# Patient Record
(demographics unavailable — no encounter records)

---

## 2025-04-14 NOTE — IMAGING
[Left] : left knee [AP] : anteroposterior [Lateral] : lateral [Moderate tricompartmental OA lateral narrowing] : Moderate tricompartmental OA lateral narrowing

## 2025-04-14 NOTE — PHYSICAL EXAM
[Left] : left knee [NL (0)] : extension 0 degrees [5___] : hamstring 5[unfilled]/5 [] : negative Lachmann [Equivocal] : equivocal Edelmira [TWNoteComboBox7] : flexion 120 degrees

## 2025-04-14 NOTE — HISTORY OF PRESENT ILLNESS
[5] : 5 [Dull/Aching] : dull/aching [Meds] : meds [Ice] : ice [de-identified] : 04/14/2025: SEB HOLLAND is a 73 year-old female here for L knee pain. She states that she noticed swelling and stiffness in her left knee four days ago. Pain located laterally. No injury/trauma. Had 1 prior CSI left knee but had bad reaction to the cortisone [] : no [FreeTextEntry1] : L knee [de-identified] : prolonged standing

## 2025-04-22 NOTE — PROCEDURE
[Large Joint Injection] : Large joint injection [Left] : of the left [Knee] : knee [Pain] : pain [Alcohol] : alcohol [Betadine] : betadine [Ethyl Chloride sprayed topically] : ethyl chloride sprayed topically [Sterile technique used] : sterile technique used [Orthovisc (30mg)] : 30mg of Orthovisc [#1] : series #1 [] : Patient tolerated procedure well [Call if redness, pain or fever occur] : call if redness, pain or fever occur [Apply ice for 15min out of every hour for the next 12-24 hours as tolerated] : apply ice for 15 minutes out of every hour for the next 12-24 hours as tolerated [Patient was advised to rest the joint(s) for ____ days] : patient was advised to rest the joint(s) for [unfilled] days [Previous OTC use and PT nontherapeutic] : patient has tried OTC's including aspirin, Ibuprofen, Aleve, etc or prescription NSAIDS, and/or exercises at home and/or physical therapy without satisfactory response [Patient had decreased mobility in the joint] : patient had decreased mobility in the joint [Risks, benefits, alternatives discussed / Verbal consent obtained] : the risks benefits, and alternatives have been discussed, and verbal consent was obtained

## 2025-04-22 NOTE — HISTORY OF PRESENT ILLNESS
[de-identified] : 04/22/2025 Orthovisc 1 04/14/2025: SEB HOLLAND is a 73 year-old female here for L knee pain. She states that she noticed swelling and stiffness in her left knee four days ago. Pain located laterally. No injury/trauma. Had 1 prior CSI left knee but had bad reaction to the cortisone [5] : 5 [Dull/Aching] : dull/aching [Meds] : meds [Ice] : ice [] : no [FreeTextEntry1] : L knee [de-identified] : prolonged standing

## 2025-04-22 NOTE — PHYSICAL EXAM
Pt reports positive fetal movement. Denies any contractions, cramping, fluid loss, or vaginal bleeding.  Pt denies feeling short of breath.  Does have a non-productive cough.  Oxygen was on 2 L nc.  Oxygen decreased to 1 L nc and sats at 95%.  Will continue to monitor.  Pt encouraged to use incentive spirometer 10 times every hour.  Pt agreeable.   [Left] : left knee [NL (0)] : extension 0 degrees [5___] : hamstring 5[unfilled]/5 [] : negative Lachmann [Equivocal] : equivocal Edelmira [TWNoteComboBox7] : flexion 120 degrees

## 2025-04-29 NOTE — HISTORY OF PRESENT ILLNESS
[5] : 5 [Dull/Aching] : dull/aching [Meds] : meds [Ice] : ice [de-identified] : 04/29/2025 Orthovisc 2 04/22/2025 Orthovisc 1 04/14/2025: SEB HOLLAND is a 73 year-old female here for L knee pain. She states that she noticed swelling and stiffness in her left knee four days ago. Pain located laterally. No injury/trauma. Had 1 prior CSI left knee but had bad reaction to the cortisone [] : no [FreeTextEntry1] : L knee [de-identified] : prolonged standing

## 2025-04-29 NOTE — HISTORY OF PRESENT ILLNESS
[5] : 5 [Dull/Aching] : dull/aching [Meds] : meds [Ice] : ice [de-identified] : 04/29/2025 Orthovisc 2 04/22/2025 Orthovisc 1 04/14/2025: SEB HOLLAND is a 73 year-old female here for L knee pain. She states that she noticed swelling and stiffness in her left knee four days ago. Pain located laterally. No injury/trauma. Had 1 prior CSI left knee but had bad reaction to the cortisone [] : no [FreeTextEntry1] : L knee [de-identified] : prolonged standing

## 2025-04-29 NOTE — PROCEDURE
[Large Joint Injection] : Large joint injection [Left] : of the left [Knee] : knee [Pain] : pain [Alcohol] : alcohol [Betadine] : betadine [Ethyl Chloride sprayed topically] : ethyl chloride sprayed topically [Sterile technique used] : sterile technique used [Orthovisc (30mg)] : 30mg of Orthovisc [#2] : series #2 [] : Patient tolerated procedure well [Call if redness, pain or fever occur] : call if redness, pain or fever occur [Apply ice for 15min out of every hour for the next 12-24 hours as tolerated] : apply ice for 15 minutes out of every hour for the next 12-24 hours as tolerated [Patient was advised to rest the joint(s) for ____ days] : patient was advised to rest the joint(s) for [unfilled] days [Previous OTC use and PT nontherapeutic] : patient has tried OTC's including aspirin, Ibuprofen, Aleve, etc or prescription NSAIDS, and/or exercises at home and/or physical therapy without satisfactory response [Patient had decreased mobility in the joint] : patient had decreased mobility in the joint [Risks, benefits, alternatives discussed / Verbal consent obtained] : the risks benefits, and alternatives have been discussed, and verbal consent was obtained

## 2025-05-05 NOTE — HISTORY OF PRESENT ILLNESS
[de-identified] :  05/05/2025: For continued  Orthovisc #3 to the left knee  04/22/2025 Orthovisc 1 04/14/2025: SEB HOLLAND is a 73 year-old female here for L knee pain. She states that she noticed swelling and stiffness in her left knee four days ago. Pain located laterally. No injury/trauma. Had 1 prior CSI left knee but had bad reaction to the cortisone [5] : 5 [Dull/Aching] : dull/aching [Meds] : meds [Ice] : ice [] : no [FreeTextEntry1] : L knee [de-identified] : prolonged standing

## 2025-05-05 NOTE — HISTORY OF PRESENT ILLNESS
[de-identified] :  05/05/2025: For continued  Orthovisc #3 to the left knee  04/22/2025 Orthovisc 1 04/14/2025: SEB HOLLAND is a 73 year-old female here for L knee pain. She states that she noticed swelling and stiffness in her left knee four days ago. Pain located laterally. No injury/trauma. Had 1 prior CSI left knee but had bad reaction to the cortisone [5] : 5 [Dull/Aching] : dull/aching [Meds] : meds [Ice] : ice [] : no [FreeTextEntry1] : L knee [de-identified] : prolonged standing

## 2025-05-12 NOTE — PHYSICAL EXAM
[Left] : left knee [NL (0)] : extension 0 degrees [5___] : hamstring 5[unfilled]/5 [Equivocal] : equivocal Edelmira [] : negative Lachmann [TWNoteComboBox7] : flexion 120 degrees

## 2025-05-12 NOTE — HISTORY OF PRESENT ILLNESS
[5] : 5 [Dull/Aching] : dull/aching [Meds] : meds [Ice] : ice [de-identified] :  05/12/2025:  For continued  Orthovisc #4 to the left knee  04/22/2025 Orthovisc 1 04/14/2025: SEB HOLLAND is a 73 year-old female here for L knee pain. She states that she noticed swelling and stiffness in her left knee four days ago. Pain located laterally. No injury/trauma. Had 1 prior CSI left knee but had bad reaction to the cortisone [] : no [FreeTextEntry1] : L knee [de-identified] : prolonged standing

## 2025-05-12 NOTE — PROCEDURE
[Large Joint Injection] : Large joint injection [Left] : of the left [Knee] : knee [Pain] : pain [Alcohol] : alcohol [Betadine] : betadine [Ethyl Chloride sprayed topically] : ethyl chloride sprayed topically [Sterile technique used] : sterile technique used [Orthovisc (30mg)] : 30mg of Orthovisc [] : Patient tolerated procedure well [Call if redness, pain or fever occur] : call if redness, pain or fever occur [Apply ice for 15min out of every hour for the next 12-24 hours as tolerated] : apply ice for 15 minutes out of every hour for the next 12-24 hours as tolerated [Patient was advised to rest the joint(s) for ____ days] : patient was advised to rest the joint(s) for [unfilled] days [Previous OTC use and PT nontherapeutic] : patient has tried OTC's including aspirin, Ibuprofen, Aleve, etc or prescription NSAIDS, and/or exercises at home and/or physical therapy without satisfactory response [Patient had decreased mobility in the joint] : patient had decreased mobility in the joint [Risks, benefits, alternatives discussed / Verbal consent obtained] : the risks benefits, and alternatives have been discussed, and verbal consent was obtained [#3] : series #3

## 2025-05-19 NOTE — PROCEDURE
[Large Joint Injection] : Large joint injection [Left] : of the left [Knee] : knee [Pain] : pain [Alcohol] : alcohol [Betadine] : betadine [Ethyl Chloride sprayed topically] : ethyl chloride sprayed topically [Sterile technique used] : sterile technique used [Orthovisc (30mg)] : 30mg of Orthovisc [#4] : series #4 [] : Patient tolerated procedure well [Call if redness, pain or fever occur] : call if redness, pain or fever occur [Apply ice for 15min out of every hour for the next 12-24 hours as tolerated] : apply ice for 15 minutes out of every hour for the next 12-24 hours as tolerated [Patient was advised to rest the joint(s) for ____ days] : patient was advised to rest the joint(s) for [unfilled] days [Previous OTC use and PT nontherapeutic] : patient has tried OTC's including aspirin, Ibuprofen, Aleve, etc or prescription NSAIDS, and/or exercises at home and/or physical therapy without satisfactory response [Patient had decreased mobility in the joint] : patient had decreased mobility in the joint [Risks, benefits, alternatives discussed / Verbal consent obtained] : the risks benefits, and alternatives have been discussed, and verbal consent was obtained

## 2025-05-19 NOTE — HISTORY OF PRESENT ILLNESS
[5] : 5 [Dull/Aching] : dull/aching [Meds] : meds [Ice] : ice [de-identified] :  05/19/2025: For continued  Orthovisc #4 to the left knee  04/22/2025 Orthovisc 1 04/14/2025: SEB HOLALND is a 73 year-old female here for L knee pain. She states that she noticed swelling and stiffness in her left knee four days ago. Pain located laterally. No injury/trauma. Had 1 prior CSI left knee but had bad reaction to the cortisone [] : no [FreeTextEntry1] : L knee [de-identified] : prolonged standing

## 2025-07-05 NOTE — PHYSICAL EXAM
[MA] : MA [Appropriately responsive] : appropriately responsive [Alert] : alert [No Acute Distress] : no acute distress [No Lymphadenopathy] : no lymphadenopathy [Soft] : soft [Non-tender] : non-tender [Non-distended] : non-distended [No HSM] : No HSM [No Lesions] : no lesions [No Mass] : no mass [Oriented x3] : oriented x3 [Examination Of The Breasts] : a normal appearance [No Masses] : no breast masses were palpable [Labia Majora] : normal [Labia Minora] : normal [Normal] : normal [Uterine Adnexae] : normal

## 2025-07-05 NOTE — HISTORY OF PRESENT ILLNESS
[Menarche Age: ____] : age at menarche was [unfilled] [Previously active] : previously active [Men] : men [postmenopausal] : postmenopausal [N] : Patient reports normal menses [Y] : Patient uses contraception [No] : No [Mammogramdate] : 08/06/2024 [PapSmeardate] : 06/18/2024 [TextBox_19] : bilateral br2 [TextBox_31] : WNL  [ColonoscopyDate] : 2021 [TextBox_43] : wnl per pt repeat in 2026  [HPVDate] : 06/18/2024 [TextBox_78] : neg  [LMPDate] : 06/2021 [PGHxTotal] : 2 [[facility] : 2 [FreeTextEntry1] : 06/2021 [FreeTextEntry2] :

## 2025-07-05 NOTE — PLAN
[FreeTextEntry1] : Pelvic sonogram advised for patient's pelvic pressure and to recheck endometrial lining.  Patient has not had any bleeding and denies vaginal spotting  Patient had a benign EMB endometrial polyp removed last year 2024 benign pathology  We discussed the Gardasil vaccine and patient will check her insurance  We will see her the day of her sonogram and discuss results together.  I would also advise a 6-month surveillance Pap given her history of JULIAN-3

## 2025-07-27 NOTE — PHYSICAL EXAM
[Normal] : No focal neurologic defects observed [de-identified] : Deferred [Fully active, able to carry on all pre-disease performance without restriction] : Status 0 - Fully active, able to carry on all pre-disease performance without restriction

## 2025-07-27 NOTE — PHYSICAL EXAM
[Normal] : No focal neurologic defects observed [de-identified] : Deferred [Fully active, able to carry on all pre-disease performance without restriction] : Status 0 - Fully active, able to carry on all pre-disease performance without restriction

## 2025-07-27 NOTE — HISTORY OF PRESENT ILLNESS
[FreeTextEntry1] : 72 yo  [place]  x2 postmenopausal female referred by Arlene Hollis NP, presents to office for evaluation of abnormal pap smear. Patient with longstanding history of abnormal pap smears, as well as cervical dysplasia dating back to . She is s/p pelvic EUA, CKC, ECC with Dr. De La Torre on 2022. Final pathology demonstrated focal HSIL. She continued to follow with GYN ONC until she had a normal pap smear and benign biopsy in 2023. She has been following up with GYN since. Most recently, her pap smear in July demonstrated ASCUS, HRHPV+. She was encouraged to return to GYN ONC for colposcopy. Patient denies tobacco use. Denies history of HIV or being immunosuppressed.   Pt very frustrated that her HPV has come back. Otherwise feels well w/o complaints.  HISTORY - PRESENT - Pap 25- ASCUS, HRHPV+ (16/18/45 negative) - EMB 24- Fragments of endometrial polyp, strips of atrophic endometrium. Benign squamous and endocervical epithelium - Pap 24- ASCUS, negative HPV - ECC 23- Scant benign endocervical mucosa admixed with mucus material - Pap 23- NILM, negative HPV  HCM Pap: 25- ASCUS, HRHPV+ (16/18/45 negative) Mammo/ breast US: 2024- BR2 C-scope: 2021- WNL, as per patient DEXA: 2022- WNL   Ob Hx:  via  x2.  Gyn Hx: Postmenopausal. History of cervical dysplasia (CIN3). Med Hx: Cervical dysplasia, HTN,  Surg Hx: CKC, lap appendectomy 2018, oophorectomy Meds: Spironolactone, Xanax All: Codeine Fam Hx: Colon CA- mat aunt, Ovarian CA- mat cousin, breast CA- mat cousin Soc Hx: . Denies tobacco or illicit drug use. Occasional alcohol use.

## 2025-07-27 NOTE — HISTORY OF PRESENT ILLNESS
[FreeTextEntry1] : 72 yo  [place]  x2 postmenopausal female referred by Arleen Hollis NP, presents to office for evaluation of abnormal pap smear. Patient with longstanding history of abnormal pap smears, as well as cervical dysplasia dating back to . She is s/p pelvic EUA, CKC, ECC with Dr. De La Torre on 2022. Final pathology demonstrated focal HSIL. She continued to follow with GYN ONC until she had a normal pap smear and benign biopsy in 2023. She has been following up with GYN since. Most recently, her pap smear in July demonstrated ASCUS, HRHPV+. She was encouraged to return to GYN ONC for colposcopy. Patient denies tobacco use. Denies history of HIV or being immunosuppressed.   Pt very frustrated that her HPV has come back. Otherwise feels well w/o complaints.  HISTORY - PRESENT - Pap 25- ASCUS, HRHPV+ (16/18/45 negative) - EMB 24- Fragments of endometrial polyp, strips of atrophic endometrium. Benign squamous and endocervical epithelium - Pap 24- ASCUS, negative HPV - ECC 23- Scant benign endocervical mucosa admixed with mucus material - Pap 23- NILM, negative HPV  HCM Pap: 25- ASCUS, HRHPV+ (16/18/45 negative) Mammo/ breast US: 2024- BR2 C-scope: 2021- WNL, as per patient DEXA: 2022- WNL   Ob Hx:  via  x2.  Gyn Hx: Postmenopausal. History of cervical dysplasia (CIN3). Med Hx: Cervical dysplasia, HTN,  Surg Hx: CKC, lap appendectomy 2018, oophorectomy Meds: Spironolactone, Xanax All: Codeine Fam Hx: Colon CA- mat aunt, Ovarian CA- mat cousin, breast CA- mat cousin Soc Hx: . Denies tobacco or illicit drug use. Occasional alcohol use.

## 2025-07-27 NOTE — CHIEF COMPLAINT
[FreeTextEntry1] : [facility] [address] [place] [address] [phone]  CC: Abnormal pap (ASCUS, HRHPV+).  History of CIN3.

## 2025-07-27 NOTE — CHIEF COMPLAINT
[FreeTextEntry1] : [facility] [address] [place] [address] [phone]  CC: Abnormal pap (ASCUS, HRHPV+).  History of CIN3.

## 2025-07-27 NOTE — ASSESSMENT
[FreeTextEntry1] : 74 yo  via  x2 postmenopausal female  presents to office for evaluation of abnormal pap smear. Patient with longstanding history of abnormal pap smears, as well as cervical dysplasia dating back to . Most recently, her pap smear in July demonstrated ASCUS, HRHPV+. Recommended Colposcopy today, pt decline. Very frustrated with her recurrent HPV. Advised pt she needs further investigation to assess if underlying malignancy. Emphasized importance of timely colposcopy. Pt declined today. Reports she will RTO in 2wks for colposcopy at that time.

## 2025-07-27 NOTE — ASSESSMENT
[FreeTextEntry1] : 72 yo  via  x2 postmenopausal female  presents to office for evaluation of abnormal pap smear. Patient with longstanding history of abnormal pap smears, as well as cervical dysplasia dating back to . Most recently, her pap smear in July demonstrated ASCUS, HRHPV+. Recommended Colposcopy today, pt decline. Very frustrated with her recurrent HPV. Advised pt she needs further investigation to assess if underlying malignancy. Emphasized importance of timely colposcopy. Pt declined today. Reports she will RTO in 2wks for colposcopy at that time.

## 2025-07-27 NOTE — CHIEF COMPLAINT
[FreeTextEntry1] : [facility] [address] [place] [address] [phone]  CC: Abnormal pap (ASCUS, HRHPV+).  History of CIN3.

## 2025-07-27 NOTE — PHYSICAL EXAM
[Normal] : No focal neurologic defects observed [de-identified] : Deferred [Fully active, able to carry on all pre-disease performance without restriction] : Status 0 - Fully active, able to carry on all pre-disease performance without restriction